# Patient Record
(demographics unavailable — no encounter records)

---

## 2025-01-24 NOTE — ASSESSMENT
[FreeTextEntry1] : Sensation of ear fullness (bilateral), cerumen impaction - removed in office.  Offered hearing test, deferred.  Follow up PRN.     Total time spent on patient encounter including review of patient's medical history, physical examination, interpretation of any indicated labs / imaging and counseling, excluding time spent performing any indicated procedures: 35 minutes.    James Hodges MD, MPH Director of Pediatric Otolaryngology [facility] / [facility]

## 2025-01-24 NOTE — HISTORY OF PRESENT ILLNESS
[de-identified] : Patient presents today for c/o ear check / wax removal and bilateral sensation of ear fullness. Denies any otalgia, otorrhea, tinnitus. History of ear infections when she was a child. No history of smoking. Takes prednisone every once in a while, for nasal congestion.  She states she is here for a routine cleaning.  No further complaints.

## 2025-01-24 NOTE — PHYSICAL EXAM
[Midline] : trachea located in midline position [de-identified] : Bilateral cerumen impaction - removed with suction. loop curette [Normal] : palpation of lymph nodes is normal

## 2025-01-24 NOTE — PHYSICAL EXAM
[Midline] : trachea located in midline position [de-identified] : Bilateral cerumen impaction - removed with suction. loop curette [Normal] : palpation of lymph nodes is normal

## 2025-01-24 NOTE — ASSESSMENT
[FreeTextEntry1] : Sensation of ear fullness (bilateral), cerumen impaction - removed in office.  Offered hearing test, deferred.  Follow up PRN.     Total time spent on patient encounter including review of patient's medical history, physical examination, interpretation of any indicated labs / imaging and counseling, excluding time spent performing any indicated procedures: 35 minutes.    James Hodges MD, MPH Director of Pediatric Otolaryngology [facility] / [facility]

## 2025-01-24 NOTE — HISTORY OF PRESENT ILLNESS
[de-identified] : Patient presents today for c/o ear check / wax removal and bilateral sensation of ear fullness. Denies any otalgia, otorrhea, tinnitus. History of ear infections when she was a child. No history of smoking. Takes prednisone every once in a while, for nasal congestion.  She states she is here for a routine cleaning.  No further complaints.